# Patient Record
Sex: FEMALE | Race: WHITE | NOT HISPANIC OR LATINO
[De-identification: names, ages, dates, MRNs, and addresses within clinical notes are randomized per-mention and may not be internally consistent; named-entity substitution may affect disease eponyms.]

---

## 2017-06-23 PROBLEM — Z00.00 ENCOUNTER FOR PREVENTIVE HEALTH EXAMINATION: Status: ACTIVE | Noted: 2017-06-23

## 2017-06-30 ENCOUNTER — ASOB RESULT (OUTPATIENT)
Age: 42
End: 2017-06-30

## 2017-06-30 ENCOUNTER — APPOINTMENT (OUTPATIENT)
Dept: ANTEPARTUM | Facility: CLINIC | Age: 42
End: 2017-06-30

## 2017-06-30 ENCOUNTER — OUTPATIENT (OUTPATIENT)
Dept: OUTPATIENT SERVICES | Facility: HOSPITAL | Age: 42
LOS: 1 days | End: 2017-06-30
Payer: MEDICAID

## 2017-06-30 ENCOUNTER — APPOINTMENT (OUTPATIENT)
Dept: ANTEPARTUM | Facility: HOSPITAL | Age: 42
End: 2017-06-30

## 2017-06-30 ENCOUNTER — OUTPATIENT (OUTPATIENT)
Dept: OUTPATIENT SERVICES | Facility: HOSPITAL | Age: 42
LOS: 1 days | End: 2017-06-30

## 2017-06-30 DIAGNOSIS — Z36 ENCOUNTER FOR ANTENATAL SCREENING OF MOTHER: ICD-10-CM

## 2017-06-30 PROCEDURE — 88267 CHROMOSOME ANALYS PLACENTA: CPT

## 2017-06-30 PROCEDURE — 88291 CYTO/MOLECULAR REPORT: CPT

## 2017-06-30 PROCEDURE — 88235 TISSUE CULTURE PLACENTA: CPT

## 2017-06-30 PROCEDURE — 88285 CHROMOSOME COUNT ADDITIONAL: CPT

## 2017-06-30 PROCEDURE — 88280 CHROMOSOME KARYOTYPE STUDY: CPT

## 2017-07-12 ENCOUNTER — ASOB RESULT (OUTPATIENT)
Age: 42
End: 2017-07-12

## 2017-07-12 ENCOUNTER — APPOINTMENT (OUTPATIENT)
Dept: ANTEPARTUM | Facility: CLINIC | Age: 42
End: 2017-07-12

## 2017-07-12 DIAGNOSIS — O28.3 ABNORMAL ULTRASONIC FINDING ON ANTENATAL SCREENING OF MOTHER: ICD-10-CM

## 2017-07-12 DIAGNOSIS — Z33.2 ENCOUNTER FOR ELECTIVE TERMINATION OF PREGNANCY: ICD-10-CM

## 2017-07-12 DIAGNOSIS — O09.812 SUPERVISION OF PREGNANCY RESULTING FROM ASSISTED REPRODUCTIVE TECHNOLOGY, SECOND TRIMESTER: ICD-10-CM

## 2017-07-12 DIAGNOSIS — O09.522 SUPERVISION OF ELDERLY MULTIGRAVIDA, SECOND TRIMESTER: ICD-10-CM

## 2017-07-13 ENCOUNTER — OUTPATIENT (OUTPATIENT)
Dept: OUTPATIENT SERVICES | Facility: HOSPITAL | Age: 42
LOS: 1 days | End: 2017-07-13

## 2017-07-13 ENCOUNTER — ASOB RESULT (OUTPATIENT)
Age: 42
End: 2017-07-13

## 2017-07-13 ENCOUNTER — APPOINTMENT (OUTPATIENT)
Dept: ANTEPARTUM | Facility: HOSPITAL | Age: 42
End: 2017-07-13

## 2017-07-13 VITALS
RESPIRATION RATE: 16 BRPM | TEMPERATURE: 99 F | HEART RATE: 70 BPM | SYSTOLIC BLOOD PRESSURE: 110 MMHG | HEIGHT: 67.5 IN | DIASTOLIC BLOOD PRESSURE: 70 MMHG | WEIGHT: 162.92 LBS

## 2017-07-13 DIAGNOSIS — Z98.890 OTHER SPECIFIED POSTPROCEDURAL STATES: Chronic | ICD-10-CM

## 2017-07-13 DIAGNOSIS — O28.3 ABNORMAL ULTRASONIC FINDING ON ANTENATAL SCREENING OF MOTHER: ICD-10-CM

## 2017-07-13 DIAGNOSIS — Z41.1 ENCOUNTER FOR COSMETIC SURGERY: Chronic | ICD-10-CM

## 2017-07-13 PROBLEM — Z33.2 LEGAL TERMINATION OF PREGNANCY: Status: ACTIVE | Noted: 2017-07-13

## 2017-07-13 LAB
BLD GP AB SCN SERPL QL: NEGATIVE — SIGNIFICANT CHANGE UP
HCT VFR BLD CALC: 39.6 % — SIGNIFICANT CHANGE UP (ref 34.5–45)
HGB BLD-MCNC: 13.2 G/DL — SIGNIFICANT CHANGE UP (ref 11.5–15.5)
MCHC RBC-ENTMCNC: 30.7 PG — SIGNIFICANT CHANGE UP (ref 27–34)
MCHC RBC-ENTMCNC: 33.3 % — SIGNIFICANT CHANGE UP (ref 32–36)
MCV RBC AUTO: 92.1 FL — SIGNIFICANT CHANGE UP (ref 80–100)
NRBC # FLD: 0 — SIGNIFICANT CHANGE UP
PLATELET # BLD AUTO: 194 K/UL — SIGNIFICANT CHANGE UP (ref 150–400)
PMV BLD: 11.3 FL — SIGNIFICANT CHANGE UP (ref 7–13)
RBC # BLD: 4.3 M/UL — SIGNIFICANT CHANGE UP (ref 3.8–5.2)
RBC # FLD: 13.4 % — SIGNIFICANT CHANGE UP (ref 10.3–14.5)
RH IG SCN BLD-IMP: POSITIVE — SIGNIFICANT CHANGE UP
WBC # BLD: 7.41 K/UL — SIGNIFICANT CHANGE UP (ref 3.8–10.5)
WBC # FLD AUTO: 7.41 K/UL — SIGNIFICANT CHANGE UP (ref 3.8–10.5)

## 2017-07-13 RX ORDER — SODIUM CHLORIDE 9 MG/ML
1000 INJECTION, SOLUTION INTRAVENOUS
Qty: 0 | Refills: 0 | Status: DISCONTINUED | OUTPATIENT
Start: 2017-07-14 | End: 2017-07-29

## 2017-07-13 RX ORDER — DOXYCYCLINE HYCLATE 100 MG/1
100 CAPSULE ORAL
Qty: 10 | Refills: 0 | Status: COMPLETED | COMMUNITY
Start: 2017-07-13 | End: 2017-07-18

## 2017-07-13 RX ORDER — MISOPROSTOL 200 UG/1
200 TABLET ORAL
Qty: 1 | Refills: 0 | Status: ACTIVE | COMMUNITY
Start: 2017-07-13 | End: 1900-01-01

## 2017-07-13 NOTE — H&P PST ADULT - NSANTHOSAYNRD_GEN_A_CORE
No. NUPUR screening performed.  STOP BANG Legend: 0-2 = LOW Risk; 3-4 = INTERMEDIATE Risk; 5-8 = HIGH Risk

## 2017-07-13 NOTE — H&P PST ADULT - TEACHING/LEARNING LEARNING PREFERENCES
skill demonstration/pictorial/verbal instruction/computer/internet/individual instruction/written material/group instruction/audio/video

## 2017-07-13 NOTE — H&P PST ADULT - MUSCULOSKELETAL
details… detailed exam no joint swelling/normal strength/no joint warmth/no joint erythema/no calf tenderness/ROM intact

## 2017-07-13 NOTE — ASU PATIENT PROFILE, ADULT - TEACHING/LEARNING LEARNING PREFERENCES
skill demonstration/pictorial/individual instruction/written material/group instruction/verbal instruction/computer/internet/audio/video

## 2017-07-13 NOTE — H&P PST ADULT - PROBLEM SELECTOR PLAN 1
Dilation and evacuation  One day pre op , bloods sent stat , discussed with Anesthesia Dilation and evacuation  One day pre op , bloods sent stat , discussed with (Anesthesia) Dr Mercedes Dilation and evacuation  One day pre op , bloods sent stat , discussed with (Anesthesia) Dr Mac

## 2017-07-13 NOTE — H&P PST ADULT - ASSESSMENT
abnormal ultrasonic finding on  screening of mother ; abnormal chromosomal and genetic finding on  screening of mother

## 2017-07-13 NOTE — H&P PST ADULT - LYMPHATIC
anterior cervical R/posterior cervical L/supraclavicular L/posterior cervical R/supraclavicular R/anterior cervical L

## 2017-07-13 NOTE — H&P PST ADULT - PMH
Urinary tract infection, site unspecified  last 3/2016 Ganglion cyst  left wrist  Urinary tract infection, site unspecified  last 3/2016

## 2017-07-13 NOTE — H&P PST ADULT - RS GEN PE MLT RESP DETAILS PC
no intercostal retractions/no rales/no rhonchi/no chest wall tenderness/breath sounds equal/good air movement/clear to auscultation bilaterally/respirations non-labored/no wheezes

## 2017-07-13 NOTE — H&P PST ADULT - HISTORY OF PRESENT ILLNESS
This is a 41 y.o. female LMP 3/20/17  sono 17 - abnormal .Pt at 15 weeks gestation .  Pt has abnormal ultrasonic finding on  screening of mother , abnormal chromosomal and genetic finding on  screening of mother . Pt now for surgery .

## 2017-07-14 ENCOUNTER — OUTPATIENT (OUTPATIENT)
Dept: OUTPATIENT SERVICES | Facility: HOSPITAL | Age: 42
LOS: 1 days | Discharge: HOME | End: 2017-07-14
Payer: COMMERCIAL

## 2017-07-14 ENCOUNTER — RESULT REVIEW (OUTPATIENT)
Age: 42
End: 2017-07-14

## 2017-07-14 VITALS
HEART RATE: 58 BPM | TEMPERATURE: 99 F | OXYGEN SATURATION: 99 % | SYSTOLIC BLOOD PRESSURE: 122 MMHG | HEIGHT: 67.5 IN | WEIGHT: 162.92 LBS | RESPIRATION RATE: 16 BRPM | DIASTOLIC BLOOD PRESSURE: 75 MMHG

## 2017-07-14 VITALS
OXYGEN SATURATION: 97 % | SYSTOLIC BLOOD PRESSURE: 115 MMHG | RESPIRATION RATE: 16 BRPM | DIASTOLIC BLOOD PRESSURE: 63 MMHG | TEMPERATURE: 98 F | HEART RATE: 57 BPM

## 2017-07-14 DIAGNOSIS — O28.3 ABNORMAL ULTRASONIC FINDING ON ANTENATAL SCREENING OF MOTHER: ICD-10-CM

## 2017-07-14 DIAGNOSIS — Z41.1 ENCOUNTER FOR COSMETIC SURGERY: Chronic | ICD-10-CM

## 2017-07-14 DIAGNOSIS — Z98.890 OTHER SPECIFIED POSTPROCEDURAL STATES: Chronic | ICD-10-CM

## 2017-07-14 LAB — RH IG SCN BLD-IMP: POSITIVE — SIGNIFICANT CHANGE UP

## 2017-07-14 PROCEDURE — 88305 TISSUE EXAM BY PATHOLOGIST: CPT | Mod: 26

## 2017-07-14 PROCEDURE — 59841 INDUCED ABORTION DILAT&EVAC: CPT

## 2017-07-14 PROCEDURE — 76998 US GUIDE INTRAOP: CPT | Mod: 26

## 2017-07-14 RX ORDER — OXYCODONE HYDROCHLORIDE 5 MG/1
5 TABLET ORAL EVERY 4 HOURS
Qty: 0 | Refills: 0 | Status: DISCONTINUED | OUTPATIENT
Start: 2017-07-14 | End: 2017-07-14

## 2017-07-14 RX ORDER — FENTANYL CITRATE 50 UG/ML
25 INJECTION INTRAVENOUS
Qty: 0 | Refills: 0 | Status: DISCONTINUED | OUTPATIENT
Start: 2017-07-14 | End: 2017-07-14

## 2017-07-14 RX ORDER — SODIUM CHLORIDE 9 MG/ML
1000 INJECTION, SOLUTION INTRAVENOUS
Qty: 0 | Refills: 0 | Status: DISCONTINUED | OUTPATIENT
Start: 2017-07-14 | End: 2017-07-29

## 2017-07-14 RX ADMIN — OXYCODONE HYDROCHLORIDE 5 MILLIGRAM(S): 5 TABLET ORAL at 14:40

## 2017-07-14 NOTE — ASU DISCHARGE PLAN (ADULT/PEDIATRIC). - NURSING INSTRUCTIONS
Nothing in vagina, no intercourse, no douching, no tampons, no tub baths, and no swimming for 2 weeks or until cleared by M.D. You were given 1000mg IV Tylenol for pain management.  Please DO NOT take any Tylenol containing products, such as  Vicodin, Percocet, Excedrin, many cold preparations for the next 6 hours (until  7pm today_____).  DO NOT EXCEED 3000MG OF TYLENOL OVER 24 HOURS.

## 2017-07-14 NOTE — ASU DISCHARGE PLAN (ADULT/PEDIATRIC). - MEDICATION SUMMARY - MEDICATIONS TO TAKE
I will START or STAY ON the medications listed below when I get home from the hospital:    Motrin 600 mg oral tablet  -- 1 tab(s) by mouth every 6 hours  -- Indication: For pain    Tylenol 325 mg oral tablet  -- 2 tab(s) by mouth every 4 hours  -- Indication: For pain    aspirin 81 mg oral tablet  -- 1 tab(s) by mouth once a day  -- prophylactic for fertility , last 7/12/17  -- Indication: For home med    Prenatal Multivitamins oral capsule  -- 3 tab(s) by mouth once a day  -- Indication: For home med    Fish Oil oral capsule  -- 2 cap(s) by mouth once a day  -- Indication: For home med

## 2017-07-14 NOTE — ASU DISCHARGE PLAN (ADULT/PEDIATRIC). - NOTIFY
Bleeding that does not stop/Pain not relieved by Medications/Unable to Urinate/Fever greater than 101/GYN Fever>100.4

## 2017-07-16 ENCOUNTER — TRANSCRIPTION ENCOUNTER (OUTPATIENT)
Age: 42
End: 2017-07-16

## 2017-07-18 ENCOUNTER — EMERGENCY (EMERGENCY)
Facility: HOSPITAL | Age: 42
LOS: 1 days | Discharge: HOME | End: 2017-07-18
Attending: EMERGENCY MEDICINE | Admitting: EMERGENCY MEDICINE
Payer: COMMERCIAL

## 2017-07-18 VITALS
SYSTOLIC BLOOD PRESSURE: 109 MMHG | OXYGEN SATURATION: 98 % | DIASTOLIC BLOOD PRESSURE: 76 MMHG | RESPIRATION RATE: 17 BRPM | HEART RATE: 62 BPM | TEMPERATURE: 99 F

## 2017-07-18 VITALS
TEMPERATURE: 98 F | HEIGHT: 69 IN | RESPIRATION RATE: 14 BRPM | OXYGEN SATURATION: 100 % | SYSTOLIC BLOOD PRESSURE: 107 MMHG | WEIGHT: 158.07 LBS | HEART RATE: 56 BPM | DIASTOLIC BLOOD PRESSURE: 73 MMHG

## 2017-07-18 DIAGNOSIS — M79.662 PAIN IN LEFT LOWER LEG: ICD-10-CM

## 2017-07-18 DIAGNOSIS — Z98.890 OTHER SPECIFIED POSTPROCEDURAL STATES: Chronic | ICD-10-CM

## 2017-07-18 DIAGNOSIS — Z41.1 ENCOUNTER FOR COSMETIC SURGERY: Chronic | ICD-10-CM

## 2017-07-18 PROCEDURE — 99284 EMERGENCY DEPT VISIT MOD MDM: CPT

## 2017-07-18 PROCEDURE — 93971 EXTREMITY STUDY: CPT | Mod: 26,RT

## 2017-07-18 PROCEDURE — 93971 EXTREMITY STUDY: CPT

## 2017-07-18 PROCEDURE — 99284 EMERGENCY DEPT VISIT MOD MDM: CPT | Mod: 25

## 2017-07-18 RX ORDER — IBUPROFEN 200 MG
600 TABLET ORAL ONCE
Qty: 0 | Refills: 0 | Status: COMPLETED | OUTPATIENT
Start: 2017-07-18 | End: 2017-07-18

## 2017-07-18 RX ORDER — OMEGA-3 ACID ETHYL ESTERS 1 G
2 CAPSULE ORAL
Qty: 0 | Refills: 0 | COMMUNITY

## 2017-07-18 RX ORDER — IBUPROFEN 200 MG
1 TABLET ORAL
Qty: 0 | Refills: 0 | COMMUNITY

## 2017-07-18 RX ORDER — ACETAMINOPHEN 500 MG
2 TABLET ORAL
Qty: 0 | Refills: 0 | COMMUNITY

## 2017-07-18 RX ORDER — ASPIRIN/CALCIUM CARB/MAGNESIUM 324 MG
1 TABLET ORAL
Qty: 0 | Refills: 0 | COMMUNITY

## 2017-07-18 RX ADMIN — Medication 600 MILLIGRAM(S): at 17:16

## 2017-07-18 NOTE — ED PROVIDER NOTE - PROGRESS NOTE DETAILS
doppler negative, copy of result given, advised can take otc tylenol or motrin for pain as directed, given name of ortho Dr Wade, advised follow up with her pmd Dr Cooley

## 2017-07-18 NOTE — ED ADULT NURSE NOTE - PSH
Delivered by  section    H/O plastic surgery  liposuction 0   History of rhinoplasty    S/P dilatation and curettage  17

## 2017-07-18 NOTE — ED PROVIDER NOTE - ATTENDING CONTRIBUTION TO CARE
sp recent surgery for d and c and then long car ride now has r calf pain and swelling she saw pmd who sent her to er for ro dvt. no cp no sob no hxof dvt or other bleeding clotting disorder no tobbacco  exam rle painful with cord palp post calf neuro vasc intact with pain on rom  ro dvt

## 2017-07-18 NOTE — ED PROVIDER NOTE - OBJECTIVE STATEMENT
right calf pain since Sunday, states on Friday had D & C done on Friday related to was 4 months pregnant and found out baby had turners syndrome.  was on hormones ended them in June, was traveling in a car on Saturday for two hours.  sent to ed by PMD Dr Cooley.  nonsmoker

## 2017-07-18 NOTE — ED PROVIDER NOTE - MEDICAL DECISION MAKING DETAILS
right calf pain, doppler r/o dvt right calf pain, doppler r/o dvt, doppler negative, pain med, follow up with pmd and ortho

## 2017-07-20 DIAGNOSIS — O09.522 SUPERVISION OF ELDERLY MULTIGRAVIDA, SECOND TRIMESTER: ICD-10-CM

## 2017-07-20 DIAGNOSIS — O28.3 ABNORMAL ULTRASONIC FINDING ON ANTENATAL SCREENING OF MOTHER: ICD-10-CM

## 2017-07-20 DIAGNOSIS — O09.812 SUPERVISION OF PREGNANCY RESULTING FROM ASSISTED REPRODUCTIVE TECHNOLOGY, SECOND TRIMESTER: ICD-10-CM

## 2017-07-20 DIAGNOSIS — O28.5 ABNORMAL CHROMOSOMAL AND GENETIC FINDING ON ANTENATAL SCREENING OF MOTHER: ICD-10-CM

## 2017-07-24 LAB — SURGICAL PATHOLOGY STUDY: SIGNIFICANT CHANGE UP

## 2017-12-27 ENCOUNTER — TRANSCRIPTION ENCOUNTER (OUTPATIENT)
Age: 42
End: 2017-12-27

## 2022-11-10 NOTE — ASU PATIENT PROFILE, ADULT - NS PRO PT RIGHT SUPPORT PERSON
Vaccine Information Statement(s) or the Emergency Use Authorization was given today. This has been reviewed, questions answered, and verbal consent given by Parent for injection(s) and administration of Influenza (Inactivated).    Patient tolerated without incident. See immunization grid for documentation.        
same name as above

## 2024-07-03 NOTE — ED PROVIDER NOTE - CONSTITUTIONAL, MLM
Sports Medicine Clinic Note    Subjective:    Chief Complaint   Patient presents with    Follow - Up    Hip Pain     Follow up right hip pain;  Worse or better - same      Interval History: The patient is a 42-year-old female returning for follow-up regarding persistent right lateral hip pain. She reports that despite taking Diclofenac, she experiences only short-lived pain relief, and the medication irritates her stomach. She continues to describe her pain as a dull ache over the greater trochanter with occasional radiation down the lateral thigh and into the groin. The pain interferes with daily activities and sleep. She is hesitant about trochanteric pain injections due to concerns about effectiveness but remains open to discussing further options. She is interested in obtaining an MRI for procedural planning and is considering proceeding with both the MRI and a trochanteric bursa injection.    Objective:    Right Hip Examination:    Inspection:  Non-antalgic gait.  Skin without erythema, breakdown, or rash.  No gross leg length discrepancy appreciated.    Palpation:  Tenderness elicited over the greater trochanteric region and anterior joint line over the inguinal fold.    Range of Motion:  Painless passive range of motion: Internal Rotation 35°, External Rotation 45°, Flexion 135°, Abduction 45°, Adduction 25°.    Neurovascular:  2+ dorsalis pedis and posterior tibial pulses.  Sensation intact in Femoral, Tibial, Saphenous, Sural, Deep, and Superficial Peroneal nerve distributions.  Muscle strength testing: Quadriceps, Hamstrings, Gastrocnemius-Soleus Complex, Anterior Tibialis, Extensor Hallucis Longus are intact.    Special Tests:  Negative FADIR.  Negative FATUMA.  Positive Scour.  Positive Stinchfield's.  Positive Trendelenburg on the right.    Diagnostic Studies:    No new testing.    Radiographs of the right hip previously reviewed in the office, revealing no evidence of fracture or dislocation. Normal  joint space and bony architecture.    Assessment:    Greater Trochanteric Pain Syndrome, Right.    Plan:    Additional Imaging/Workup:  Order MRI of the right hip for detailed evaluation and procedural planning.    Therapy:  Continue HEP with a focus on hip stabilization and stretching exercises.    Medications:  Discontinue Diclofenac due to gastrointestinal side effects. Can continue muscle relaxant to assist with pain management and improve sleep quality.    Procedures:  Perform trochanteric bursa injection under ultrasound guidance today to address persistent pain and inflammation.    Activity Recommendations:  Advise the patient to avoid prolonged sitting, standing, or activities that exacerbate symptoms. Recommend gentle stretching and low-impact activities.    Follow-Up:  Tentatively scheduled follow-up in 2 to 4 weeks to reassess the patient's response to the injection and MRI results. Patient instructed to return sooner if symptoms worsen or new symptoms develop.      Melchor Luther DO, JENNIFERM   Primary Care Sports Medicine    Department of Orthopaedic Surgery  [facility]    [address] [place] [address]  [address] [place] [id number]    t: [phone]  f: [phone]      [facility]   normal... Well appearing, well nourished, awake, alert, oriented to person, place, time/situation and in no apparent distress.